# Patient Record
Sex: MALE | Race: WHITE
[De-identification: names, ages, dates, MRNs, and addresses within clinical notes are randomized per-mention and may not be internally consistent; named-entity substitution may affect disease eponyms.]

---

## 2023-04-30 ENCOUNTER — HOSPITAL ENCOUNTER (OUTPATIENT)
Dept: HOSPITAL 33 - ED | Age: 23
Setting detail: OBSERVATION
LOS: 2 days | Discharge: HOME | End: 2023-05-02
Attending: FAMILY MEDICINE | Admitting: FAMILY MEDICINE
Payer: COMMERCIAL

## 2023-04-30 DIAGNOSIS — I10: ICD-10-CM

## 2023-04-30 DIAGNOSIS — Z20.828: ICD-10-CM

## 2023-04-30 DIAGNOSIS — K81.0: Primary | ICD-10-CM

## 2023-04-30 DIAGNOSIS — K82.8: ICD-10-CM

## 2023-04-30 DIAGNOSIS — Z79.899: ICD-10-CM

## 2023-04-30 LAB
ALBUMIN SERPL-MCNC: 4.8 G/DL (ref 3.5–5)
ALP SERPL-CCNC: 103 U/L (ref 38–126)
ALT SERPL-CCNC: 102 U/L (ref 0–50)
ANION GAP SERPL CALC-SCNC: 15.8 MEQ/L (ref 5–15)
AST SERPL QL: 55 U/L (ref 17–59)
BACTERIA UR CULT: NO
BASOPHILS # BLD AUTO: 0.05 X10^3/UL (ref 0–0.4)
BASOPHILS NFR BLD AUTO: 0.4 % (ref 0–0.4)
BILIRUB BLD-MCNC: 1.4 MG/DL (ref 0.2–1.3)
BUN SERPL-MCNC: 12 MG/DL (ref 9–20)
CALCIUM SPEC-MCNC: 9.1 MG/DL (ref 8.4–10.2)
CHLORIDE SERPL-SCNC: 95 MMOL/L (ref 98–107)
CO2 SERPL-SCNC: 28 MMOL/L (ref 22–30)
CREAT SERPL-MCNC: 0.68 MG/DL (ref 0.66–1.25)
EOSINOPHIL # BLD AUTO: 0.05 X10^3/UL (ref 0–0.5)
FLUAV AG NPH QL IA: NEGATIVE
FLUBV AG NPH QL IA: NEGATIVE
GFR SERPLBLD BASED ON 1.73 SQ M-ARVRAT: > 60 ML/MIN
GLUCOSE SERPL-MCNC: 136 MG/DL (ref 74–106)
HCT VFR BLD AUTO: 49 % (ref 42–50)
HGB BLD-MCNC: 15.9 G/DL (ref 12.5–18)
IMM GRANULOCYTES # BLD: 0.12 X10^3U/L (ref 0–0.03)
IMM GRANULOCYTES NFR BLD: 1 % (ref 0–0.4)
LIPASE SERPL-CCNC: 37 U/L (ref 23–300)
LYMPHOCYTES # SPEC AUTO: 1.22 X10^3/UL (ref 1–4.6)
MCH RBC QN AUTO: 27.9 PG (ref 26–32)
MCHC RBC AUTO-ENTMCNC: 32.4 G/DL (ref 32–36)
MONOCYTES # BLD AUTO: 1.16 X10^3/UL (ref 0–1.3)
NRBC # BLD AUTO: 0 X10^3U/L (ref 0–0.01)
NRBC BLD AUTO-RTO: 0 % (ref 0–0.1)
PLATELET # BLD AUTO: 300 X10^3/UL (ref 150–450)
POTASSIUM SERPLBLD-SCNC: 3.9 MMOL/L (ref 3.5–5.1)
PROT SERPL-MCNC: 8.9 G/DL (ref 6.3–8.2)
RBC # BLD AUTO: 5.69 X10^6/UL (ref 4.1–5.6)
RBC # URNS HPF: (no result) /HPF (ref 0–5)
RSV AG SPEC QL IA: NEGATIVE
SARS-COV-2 AG RESP QL IA.RAPID: NEGATIVE
SODIUM SERPL-SCNC: 135 MMOL/L (ref 137–145)
WBC # BLD AUTO: 11.6 X10^3/UL (ref 4–10.5)
WBC URNS QL MICRO: (no result) /HPF (ref 0–5)

## 2023-04-30 PROCEDURE — 96374 THER/PROPH/DIAG INJ IV PUSH: CPT

## 2023-04-30 PROCEDURE — 81001 URINALYSIS AUTO W/SCOPE: CPT

## 2023-04-30 PROCEDURE — 96375 TX/PRO/DX INJ NEW DRUG ADDON: CPT

## 2023-04-30 PROCEDURE — 0241U: CPT

## 2023-04-30 PROCEDURE — 93005 ELECTROCARDIOGRAM TRACING: CPT

## 2023-04-30 PROCEDURE — 80053 COMPREHEN METABOLIC PANEL: CPT

## 2023-04-30 PROCEDURE — 74176 CT ABD & PELVIS W/O CONTRAST: CPT

## 2023-04-30 PROCEDURE — G0378 HOSPITAL OBSERVATION PER HR: HCPCS

## 2023-04-30 PROCEDURE — 85025 COMPLETE CBC W/AUTO DIFF WBC: CPT

## 2023-04-30 PROCEDURE — 96365 THER/PROPH/DIAG IV INF INIT: CPT

## 2023-04-30 PROCEDURE — 93041 RHYTHM ECG TRACING: CPT

## 2023-04-30 PROCEDURE — 83690 ASSAY OF LIPASE: CPT

## 2023-04-30 PROCEDURE — 76705 ECHO EXAM OF ABDOMEN: CPT

## 2023-04-30 PROCEDURE — 36415 COLL VENOUS BLD VENIPUNCTURE: CPT

## 2023-04-30 PROCEDURE — 99285 EMERGENCY DEPT VISIT HI MDM: CPT

## 2023-04-30 NOTE — XRAY
CLINICAL HISTORY:Abd pain;

COMPARISON:None;

TECHNIQUES:Contiguous, multislice, nonenhanced CT scan of the abdomen and

pelvis in the axial plane with multiplanar reconstructions;

FINDINGS:

Mild enlarged liver showing no obvious focal lesion within the limitations of

noncontrast study.

Gallbladder shows no definite calculi inside.

Pancreas and spleen appear unremarkable.

No adrenal or retroperitoneal masses.

Both kidneys appear normal in size, shows normal contour and attenuation.  No

calculus, mass or hydronephrosis in either kidneys.

No ascites.  No para-aortic lymphadenopathy.

Few noncomplicated colonic diverticulosis mainly involving sigmoid colon

without evidence of diverticulitis.  Appendix visualized and appear within

normal limits.

Partially distended urinary bladder, appear free from intraluminal stones,

mass or diverticular outpouching.

Normal-sized prostate.

No acute osseous abnormalities or suspicious bony lesions.

Visualized sections of lower chest shows no focal mass or consolidation.

IMPRESSION:

Mild hepatomegaly.

Noncomplicated colonic diverticulosis mainly involving sigmoid colon without

evidence of diverticulitis.

Rest of the visualized abdominopelvic structures appear unremarkable.



_________________________________________



Electronically Signed by: Donavan Carter MD. ( 04/30/2023 15:41:48 CST)

## 2023-04-30 NOTE — ERPHSYRPT
- History of Present Illness


Time Seen by Provider: 04/30/23 15:02


Historian: patient


Exam Limitations: no limitations


Patient Subjective Stated Complaint: co right upper abd pain since yesterday , 

fever of 101 yesterday. last bm yesterday normal for him


Triage Nursing Assessment: pt alert, resp easy, walked in, skin w/d/p. no edema 

noted


Physician History: 





RUQ abd pain for a day, some fever, no other sx, still has his gallbladder.


Timing/Duration: yesterday


Activities at Onset: none


Quality: dullness


Abdominal Pain Onset Location: RUQ


Pain Radiation: no radiation


Severity of Pain-Max: moderate


Severity of Pain-Current: moderate


Modifying Factors: Improves With: nothing


Associated Symptoms: fever/chills, loss of appetite


Previous symptoms: no prior history


Allergies/Adverse Reactions: 








amoxicillin Allergy (Verified 04/30/23 14:56)


   





Home Medications: 








Lisinopril 20 mg*** [Zestril 20 MG***] 20 mg PO DAILY 04/30/23 [History]





Hx Influenza Vaccination/Date Given: No


Hx Pneumococcal Vaccination/Date Given: No


Immunizations Up to Date: Yes





Travel Risk





- International Travel


Have you traveled outside of the country in past 3 weeks: No





- Coronavirus Screening


Are you exhibiting any of the following symptoms?: No


Close contact with a COVID-19 positive Pt in past 14-21 Days: No





- Vaccine Status


Have you recieved a Covid-19 vaccination: Yes


: Pfizer





- Vaccination Dates


Date of 2cond Vaccination (if applicable): 2021





- Review of Systems


Constitutional: Fever


Eyes: No Symptoms


Ears, Nose, & Throat: No Symptoms


Respiratory: No Symptoms


Cardiac: No Symptoms


Abdominal/Gastrointestinal: Abdominal Pain


Genitourinary Symptoms: No Symptoms


Musculoskeletal: No Symptoms


Skin: No Symptoms


Neurological: No Symptoms


Psychological: No Symptoms


Endocrine: No Symptoms


Hematologic/Lymphatic: No Symptoms


Immunological/Allergic: No Symptoms


All Other Systems: Reviewed and Negative





- Past Medical History


Pertinent Past Medical History: Yes


Cardiac History: Hypertension





- Past Surgical History


Past Surgical History: Yes





- Social History


Smoking Status: Never smoker


Exposure to second hand smoke: No


Drug Use: none


Patient Lives Alone: Yes





- Nursing Vital Signs


Nursing Vital Signs: 


                               Initial Vital Signs











Temperature  98.8 F   04/30/23 14:58


 


Pulse Rate  114 H  04/30/23 14:58


 


Respiratory Rate  18   04/30/23 14:58


 


Blood Pressure  151/105   04/30/23 14:58


 


O2 Sat by Pulse Oximetry  96   04/30/23 14:58








                                   Pain Scale











Pain Intensity                 8

















- Physical Exam


General Appearance: mild distress


Eye Exam: PERRL/EOMI


Ears, Nose, Throat Exam: normal ENT inspection


Neck Exam: normal inspection, non-tender


Respiratory Exam: normal breath sounds, lungs clear


Cardiovascular Exam: regular rate/rhythm, normal heart sounds


Gastrointestinal/Abdomen Exam: soft, normal bowel sounds, tenderness (RUQ)


Male Genitalia Exam: normal genitalia


Rectal Exam: deferred


Back Exam: normal inspection, normal range of motion


Extremity Exam: normal inspection, normal range of motion


Neurologic Exam: alert, oriented x 3, cooperative


Skin Exam: normal color, warm


**SpO2 Interpretation**: normal


SpO2: 96


O2 Delivery: Room Air





- Course


Nursing assessment & vital signs reviewed: Yes


EKG Interpreted by Me: RATE ([id number]), Sinus Rhythm, NORMAL AXIS, NORMAL INTERVALS, 

NORMAL QRS, NORMAL ST-T





- CT Exams


  ** Abdomen/Pelvis


CT Interpretation: Negative, Tele-radiologist Report


Ordered Tests: 


                               Active Orders 24 hr











 Category Date Time Status


 


 EKG-ER Only STAT Care  04/30/23 18:23 Active


 


 ABDOMEN AND PELVIS W/0 CONTRAS [CT] Stat Exams  04/30/23 15:11 Completed


 


 CBC W DIFF Stat Lab  04/30/23 15:10 Completed


 


 CMP Stat Lab  04/30/23 15:36 Completed


 


 LIPASE Stat Lab  04/30/23 15:36 Completed


 


 UA W/RFX UR CULTURE Stat Lab  04/30/23 15:52 Completed








Medication Summary











Generic Name Dose Route Start Last Admin





  Trade Name Freq  PRN Reason Stop Dose Admin


 


Acetaminophen  1,000 mg  04/30/23 18:36  04/30/23 18:37





  Acetaminophen 500 Mg Tablet  PO  05/30/23 18:35  1,000 mg





  Q4H PRN PRN   Administration





  HEADACHE  














Discontinued Medications














Generic Name Dose Route Start Last Admin





  Trade Name Freq  PRN Reason Stop Dose Admin


 


Hydromorphone HCl  1 mg  04/30/23 19:17  04/30/23 19:41





  Hydromorphone 1 Mg/1ml Inj***  IV  04/30/23 19:18  1 mg





  STAT ONE   Administration


 


Hydromorphone HCl  Confirm  04/30/23 19:39 





  Hydromorphone 1 Mg/1ml Inj***  Administered  04/30/23 19:40 





  Dose  





  1 mg  





  .ROUTE  





  .STK-MED ONE  


 


Ceftriaxone Sodium/Dextrose  1 g in 50 mls @ 100 mls/hr  04/30/23 17:45  

04/30/23 19:10





  Rocephin 1 Gm-D5w 50 Ml Bag**  IV  04/30/23 18:14  Infused





  STAT STA   Infusion


 


Ceftriaxone Sodium/Dextrose  Confirm  04/30/23 17:58 





  Rocephin 1 Gm-D5w 50 Ml Bag**  Administered  04/30/23 17:59 





  Dose  





  1 g in 50 mls @ ud  





  IV  





  .STK-MED ONE  


 


Ketorolac Tromethamine  30 mg  04/30/23 17:35  04/30/23 17:39





  Ketorolac Tromethamine 30 Mg/Ml Inj  IV  04/30/23 17:36  30 mg





  STAT ONE   Administration


 


Ketorolac Tromethamine  Confirm  04/30/23 17:37 





  Ketorolac Tromethamine 30 Mg/Ml Inj  Administered  04/30/23 17:38 





  Dose  





  30 mg  





  .ROUTE  





  .STK-MED ONE  


 


Metoprolol Tartrate  5 mg  04/30/23 18:10  04/30/23 18:19





  Metoprolol Tartrate 5 Mg/5 Ml Vial  IV  04/30/23 18:11  5 mg





  STAT ONE   Administration


 


Metoprolol Tartrate  Confirm  04/30/23 18:16 





  Metoprolol Tartrate 5 Mg/5 Ml Vial  Administered  04/30/23 18:17 





  Dose  





  5 mg  





  IV  





  .STK-MED ONE  


 


Ondansetron HCl  4 mg  04/30/23 19:17  04/30/23 19:40





  Ondansetron Hcl 4 Mg/2 Ml Vial  IV  04/30/23 19:18  4 mg





  STAT ONE   Administration


 


Ondansetron HCl  Confirm  04/30/23 19:38 





  Ondansetron Hcl 4 Mg/2 Ml Vial  Administered  04/30/23 19:39 





  Dose  





  4 mg  





  .ROUTE  





  .STK-MED ONE  











Lab/Rad Data: 


                           Laboratory Result Diagrams





                                 04/30/23 15:10 





                                 04/30/23 15:36 





                               Laboratory Results











  04/30/23 04/30/23 04/30/23 Range/Units





  15:52 15:36 15:10 


 


WBC    11.6 H  (4.0-10.5)  x10^3/uL


 


RBC    5.69 H  (4.1-5.6)  x10^6/uL


 


Hgb    15.9  (12.5-18.0)  g/dL


 


Hct    49.0  (42-50)  %


 


MCV    86.1  ()  fL


 


MCH    27.9  (26-32)  pg


 


MCHC    32.4  (32-36)  g/dL


 


RDW    12.3  (11.5-14.0)  %


 


Plt Count    300  (150-450)  x10^3/uL


 


MPV    9.7  (7.5-11.0)  fL


 


Gran %    77.7 H  (36.0-66.0)  %


 


Immature Gran % (Auto)    1.0 H  (0.00-0.4)  %


 


Nucleat RBC Rel Count    0.0  (0.00-0.1)  %


 


Eos # (Auto)    0.05  (0-0.5)  x10^3/uL


 


Immature Gran # (Auto)    0.12 H  (0.00-0.03)  x10^3u/L


 


Absolute Lymphs (auto)    1.22  (1.0-4.6)  x10^3/uL


 


Absolute Monos (auto)    1.16  (0.0-1.3)  x10^3/uL


 


Absolute Nucleated RBC    0.00  (0.00-0.01)  x10^3u/L


 


Lymphocytes %    10.5 L  (24.0-44.0)  %


 


Monocytes %    10.0  (0.0-12.0)  %


 


Eosinophils %    0.4  (0.00-5.0)  %


 


Basophils %    0.4  (0.0-0.4)  %


 


Absolute Granulocytes    9.02 H  (1.4-6.9)  x10^3/uL


 


Basophils #    0.05  (0-0.4)  x10^3/uL


 


Sodium   135 L   (137-145)  mmol/L


 


Potassium   3.9   (3.5-5.1)  mmol/L


 


Chloride   95 L   ()  mmol/L


 


Carbon Dioxide   28   (22-30)  mmol/L


 


Anion Gap   15.8 H   (5-15)  MEQ/L


 


BUN   12   (9-20)  mg/dL


 


Creatinine   0.68   (0.66-1.25)  mg/dL


 


Estimated GFR   > 60.0   ML/MIN


 


Glucose   136 H   ()  mg/dL


 


Calcium   9.1   (8.4-10.2)  mg/dL


 


Total Bilirubin   1.40 H   (0.2-1.3)  mg/dL


 


AST   55   (17-59)  U/L


 


ALT   102 H   (0-50)  U/L


 


Alkaline Phosphatase   103   ()  U/L


 


Serum Total Protein   8.9 H   (6.3-8.2)  g/dL


 


Albumin   4.8   (3.5-5.0)  g/dL


 


Lipase   37   ()  U/L


 


Urine Color  Yellow    (Yellow)  


 


Urine Appearance  Clear    (Clear)  


 


Urine pH  6.5    (4.6-8.0)  


 


Ur Specific Gravity  1.025    (1.005-1.030)  


 


Urine Protein  Negative    (Negative)  


 


Urine Glucose (UA)  Negative    (Negative)  mg/dL


 


Urine Ketones  80 A    (Negative)  


 


Urine Blood  Negative    (Negative)  


 


Urine Nitrite  Negative    (Negative)  


 


Urine Bilirubin  Negative    (Negative)  


 


Urine Urobilinogen  2.0 A    (0.2)  mg/dL


 


Ur Leukocyte Esterase  Negative    (Negative)  


 


U Hyaline Cast (Auto)  NONE SEEN    (0-2)  /LPF


 


Urine Microscopic RBC  0-2    (0-5)  /HPF


 


Urine Microscopic WBC  0-2    (0-5)  /HPF


 


Ur Epithelial Cells  None Seen    (None Seen)  /HPF


 


Urine Bacteria  None Seen    (None Seen)  /HPF


 


Urine Culture Reflexed  NO    (NO)  














- Progress


Progress: improved


Progress Note: 





04/30/23 20:23


Symptoms suggest gallbladder infection, but unremarkable CT and labs, needing 

narcotics for pain control, OK for obs per Dr. Todd.


Discussed with : Ortega


Will see patient in: hospital (observation)


Counseled pt/family regarding: lab results, diagnosis, need for follow-up, rad 

results





Medical Desision Making





- Independent Historian


Additional History obtained from: Family





- Discussion of managment


Care discussed with:: on-call "doc"


Reviewed:: Test results, Need for additional workup


Agreed on:: Treatment plan, place in obs


Will see patient: in hospital





- Diagnostic Testing


Diagnostic test were ordered, analyzed, and reviewed by me: Yes


Radiological Interpretation: Teleradiologist Report





- Risk of complications


Low Risk: Low risk of morbidity from additional dx testing or treatment





- Departure


Departure Disposition: Observation


Clinical Impression: 


 Cholecystitis, acute





Condition: Stable


Critical Care Time: No


Referrals: 


ALEISHA QIU NP [Primary Care Provider] - Follow up/PCP as directed

## 2023-05-01 LAB
ALBUMIN SERPL-MCNC: 3.9 G/DL (ref 3.5–5)
ALP SERPL-CCNC: 82 U/L (ref 38–126)
ALT SERPL-CCNC: 73 U/L (ref 0–50)
ANION GAP SERPL CALC-SCNC: 12.1 MEQ/L (ref 5–15)
AST SERPL QL: 37 U/L (ref 17–59)
BASOPHILS # BLD AUTO: 0.05 X10^3/UL (ref 0–0.4)
BASOPHILS NFR BLD AUTO: 0.5 % (ref 0–0.4)
BILIRUB BLD-MCNC: 0.9 MG/DL (ref 0.2–1.3)
BLD SMEAR INTERP: YES
BUN SERPL-MCNC: 16 MG/DL (ref 9–20)
CALCIUM SPEC-MCNC: 8.2 MG/DL (ref 8.4–10.2)
CHLORIDE SERPL-SCNC: 100 MMOL/L (ref 98–107)
CO2 SERPL-SCNC: 26 MMOL/L (ref 22–30)
CREAT SERPL-MCNC: 0.68 MG/DL (ref 0.66–1.25)
EOSINOPHIL # BLD AUTO: 0.08 X10^3/UL (ref 0–0.5)
GFR SERPLBLD BASED ON 1.73 SQ M-ARVRAT: > 60 ML/MIN
GLUCOSE SERPL-MCNC: 126 MG/DL (ref 74–106)
HCT VFR BLD AUTO: 44.9 % (ref 42–50)
HGB BLD-MCNC: 14.8 G/DL (ref 12.5–18)
IMM GRANULOCYTES # BLD: 0.13 X10^3U/L (ref 0–0.03)
IMM GRANULOCYTES NFR BLD: 1.2 % (ref 0–0.4)
LYMPHOCYTES # SPEC AUTO: 1.64 X10^3/UL (ref 1–4.6)
MCH RBC QN AUTO: 28 PG (ref 26–32)
MCHC RBC AUTO-ENTMCNC: 33 G/DL (ref 32–36)
MONOCYTES # BLD AUTO: 1.75 X10^3/UL (ref 0–1.3)
NRBC # BLD AUTO: 0 X10^3U/L (ref 0–0.01)
NRBC BLD AUTO-RTO: 0 % (ref 0–0.1)
PLATELET # BLD AUTO: 275 X10^3/UL (ref 150–450)
POTASSIUM SERPLBLD-SCNC: 3.8 MMOL/L (ref 3.5–5.1)
PROT SERPL-MCNC: 7.4 G/DL (ref 6.3–8.2)
RBC # BLD AUTO: 5.28 X10^6/UL (ref 4.1–5.6)
SODIUM SERPL-SCNC: 134 MMOL/L (ref 137–145)
WBC # BLD AUTO: 10.5 X10^3/UL (ref 4–10.5)

## 2023-05-01 RX ADMIN — HYDROMORPHONE HYDROCHLORIDE PRN MG: 1 INJECTION, SOLUTION INTRAMUSCULAR; INTRAVENOUS; SUBCUTANEOUS at 15:34

## 2023-05-01 RX ADMIN — METRONIDAZOLE SCH MLS/HR: 500 INJECTION, SOLUTION INTRAVENOUS at 05:31

## 2023-05-01 RX ADMIN — HYDROMORPHONE HYDROCHLORIDE PRN MG: 1 INJECTION, SOLUTION INTRAMUSCULAR; INTRAVENOUS; SUBCUTANEOUS at 21:54

## 2023-05-01 RX ADMIN — HYDROMORPHONE HYDROCHLORIDE PRN MG: 1 INJECTION, SOLUTION INTRAMUSCULAR; INTRAVENOUS; SUBCUTANEOUS at 05:47

## 2023-05-01 RX ADMIN — METRONIDAZOLE SCH MLS/HR: 500 INJECTION, SOLUTION INTRAVENOUS at 21:10

## 2023-05-01 RX ADMIN — METRONIDAZOLE SCH MLS/HR: 500 INJECTION, SOLUTION INTRAVENOUS at 14:27

## 2023-05-01 RX ADMIN — HYDROMORPHONE HYDROCHLORIDE PRN MG: 1 INJECTION, SOLUTION INTRAMUSCULAR; INTRAVENOUS; SUBCUTANEOUS at 09:45

## 2023-05-01 RX ADMIN — METRONIDAZOLE SCH MLS/HR: 500 INJECTION, SOLUTION INTRAVENOUS at 00:32

## 2023-05-01 NOTE — PCM.HP
History of Present Illness





- Chief Complaint


Chief Complaint: abdominal pain


Date: 05/01/23


History of Present Illness: 


 is a 23 year old male patient of VANI Muñiz   who presented to ER 

C/O RUQ  pain and fever 101 since yesterday last bm yesterday normal . PMHx 

includes HTN. Patient has not had previous abdominal surgery.








- Review of Systems


Constitutional: Fever


Eyes: No Symptoms


Ears, Nose, & Throat: No Symptoms


Respiratory: No Symptoms


Cardiac: No Symptoms


Abdominal/Gastrointestinal: Abdominal Pain, Nausea


Genitourinary Symptoms: No Symptoms


Musculoskeletal: No Symptoms


Skin: No Symptoms


Neurological: No Symptoms


Psychological: No Symptoms


Endocrine: No Symptoms


Hematologic/Lymphatic: No Symptoms





Medications & Allergies


Home Medications: 


                              Home Medication List





Lisinopril 20 mg*** [Zestril 20 MG***] 20 mg PO DAILY 04/30/23 [History Confi

rmed 05/01/23]








Allergies/Adverse Reactions: 


                                    Allergies











Allergy/AdvReac Type Severity Reaction Status Date / Time


 


amoxicillin Allergy   Verified 04/30/23 14:56














- Past Medical History


Past Medical History: Yes


Neurological History: No Pertinent History


ENT History: No Pertinent History


Cardiac History: Hypertension


Respiratory History: No Pertinent History


Endocrine Medical History: No Pertinent History


Musculoskelatal History: No Pertinent History


GI Medical History: No Pertinent History


 History: No Pertinent History


Pyscho-Social History: No Pertinent History


Male Reproductive Disorders: No Pertinent History





- Past Surgical History


Past Surgical History: Yes


Neuro Surgical History: No Pertinent History


Cardiac History: No Pertinent History


Respiratory Surgery: No Pertinent History


GI Surgical History: No Pertinent History


Genitourinary Surgical Hx: No Pertinent History


Musculskeletal Surgical Hx: No Pertinent History


Male Surgical History: No Pertinent History





- Social History


Smoking Status: Never smoker


Exposure to second hand smoke: No


Alcohol: Occasionally


Drug Use: none





- Physical Exam


Vital Signs: 


                               Vital Signs - 24 hr











  Temp Pulse Resp BP Pulse Ox


 


 05/01/23 08:00  97.7 F  90  18  111/57  93 L


 


 05/01/23 04:17  99.1 F  70  20  119/70  99


 


 05/01/23 02:45  99.1 F  70  20  119/70  99


 


 05/01/23 01:26  98.1 F  94 H  17  


 


 04/30/23 22:08  98.1 F  94 H  17 04/30/23 21:34  98.1 F  94 H  17  


 


 04/30/23 20:32      96


 


 04/30/23 20:00   101 H  22  122/78  95


 


 04/30/23 19:43   98 H  13  122/78  95


 


 04/30/23 18:34  101.6 F  105 H  20  118/81  97


 


 04/30/23 18:05   121 H  16  159/87  94 L


 


 04/30/23 17:00   110 H  18  165/92  96


 


 04/30/23 16:39   114 H  18  140/106  98


 


 04/30/23 14:58  98.8 F  114 H  18  151/105  96











General Appearance: no apparent distress


Neurologic Exam: alert, oriented x 3, cooperative, normal mood/affect


Eye Exam: eyes nml inspection


Ears, Nose, Throat Exam: normal ENT inspection


Neck Exam: normal inspection


Respiratory Exam: normal breath sounds


Cardiovascular Exam: regular rate/rhythm


Gastrointestinal/Abdomen Exam: soft, tenderness (RUQ), guarding


Rectal Exam: not done


Back Exam: normal inspection


Extremity Exam: normal inspection


Skin Exam: normal color, warm, dry





Results





- Labs


Lab/Micro Results: 


                            Lab Results-Last 24 Hours











  04/30/23 04/30/23 04/30/23 Range/Units





  15:10 15:36 15:52 


 


WBC  11.6 H    (4.0-10.5)  x10^3/uL


 


RBC  5.69 H    (4.1-5.6)  x10^6/uL


 


Hgb  15.9    (12.5-18.0)  g/dL


 


Hct  49.0    (42-50)  %


 


MCV  86.1    ()  fL


 


MCH  27.9    (26-32)  pg


 


MCHC  32.4    (32-36)  g/dL


 


RDW  12.3    (11.5-14.0)  %


 


Plt Count  300    (150-450)  x10^3/uL


 


MPV  9.7    (7.5-11.0)  fL


 


Gran %  77.7 H    (36.0-66.0)  %


 


Immature Gran % (Auto)  1.0 H    (0.00-0.4)  %


 


Nucleat RBC Rel Count  0.0    (0.00-0.1)  %


 


Eos # (Auto)  0.05    (0-0.5)  x10^3/uL


 


Immature Gran # (Auto)  0.12 H    (0.00-0.03)  x10^3u/L


 


Absolute Lymphs (auto)  1.22    (1.0-4.6)  x10^3/uL


 


Absolute Monos (auto)  1.16    (0.0-1.3)  x10^3/uL


 


Absolute Nucleated RBC  0.00    (0.00-0.01)  x10^3u/L


 


Lymphocytes %  10.5 L    (24.0-44.0)  %


 


Monocytes %  10.0    (0.0-12.0)  %


 


Eosinophils %  0.4    (0.00-5.0)  %


 


Basophils %  0.4    (0.0-0.4)  %


 


Absolute Granulocytes  9.02 H    (1.4-6.9)  x10^3/uL


 


Basophils #  0.05    (0-0.4)  x10^3/uL


 


Sodium   135 L   (137-145)  mmol/L


 


Potassium   3.9   (3.5-5.1)  mmol/L


 


Chloride   95 L   ()  mmol/L


 


Carbon Dioxide   28   (22-30)  mmol/L


 


Anion Gap   15.8 H   (5-15)  MEQ/L


 


BUN   12   (9-20)  mg/dL


 


Creatinine   0.68   (0.66-1.25)  mg/dL


 


Estimated GFR   > 60.0   ML/MIN


 


Glucose   136 H   ()  mg/dL


 


Calcium   9.1   (8.4-10.2)  mg/dL


 


Total Bilirubin   1.40 H   (0.2-1.3)  mg/dL


 


AST   55   (17-59)  U/L


 


ALT   102 H   (0-50)  U/L


 


Alkaline Phosphatase   103   ()  U/L


 


Serum Total Protein   8.9 H   (6.3-8.2)  g/dL


 


Albumin   4.8   (3.5-5.0)  g/dL


 


Lipase   37   ()  U/L


 


Urine Color    Yellow  (Yellow)  


 


Urine Appearance    Clear  (Clear)  


 


Urine pH    6.5  (4.6-8.0)  


 


Ur Specific Gravity    1.025  (1.005-1.030)  


 


Urine Protein    Negative  (Negative)  


 


Urine Glucose (UA)    Negative  (Negative)  mg/dL


 


Urine Ketones    80 A  (Negative)  


 


Urine Blood    Negative  (Negative)  


 


Urine Nitrite    Negative  (Negative)  


 


Urine Bilirubin    Negative  (Negative)  


 


Urine Urobilinogen    2.0 A  (0.2)  mg/dL


 


Ur Leukocyte Esterase    Negative  (Negative)  


 


U Hyaline Cast (Auto)    NONE SEEN  (0-2)  /LPF


 


Urine Microscopic RBC    0-2  (0-5)  /HPF


 


Urine Microscopic WBC    0-2  (0-5)  /HPF


 


Ur Epithelial Cells    None Seen  (None Seen)  /HPF


 


Urine Bacteria    None Seen  (None Seen)  /HPF


 


Urine Culture Reflexed    NO  (NO)  


 


Influenza Type A Ag     (NEGATIVE)  


 


Influenza Type B Ag     (NEGATIVE)  


 


RSV (PCR)     (NEGATIVE)  


 


SARS-CoV-2 (PCR)     (NEGATIVE)  


 


Slides for Path Review     














  04/30/23 05/01/23 05/01/23 Range/Units





  19:31 04:30 04:30 


 


WBC   10.5   (4.0-10.5)  x10^3/uL


 


RBC   5.28   (4.1-5.6)  x10^6/uL


 


Hgb   14.8   (12.5-18.0)  g/dL


 


Hct   44.9   (42-50)  %


 


MCV   85.0   ()  fL


 


MCH   28.0   (26-32)  pg


 


MCHC   33.0   (32-36)  g/dL


 


RDW   12.6   (11.5-14.0)  %


 


Plt Count   275   (150-450)  x10^3/uL


 


MPV   9.3   (7.5-11.0)  fL


 


Gran %   65.3   (36.0-66.0)  %


 


Immature Gran % (Auto)   1.2 H   (0.00-0.4)  %


 


Nucleat RBC Rel Count   0.0   (0.00-0.1)  %


 


Eos # (Auto)   0.08   (0-0.5)  x10^3/uL


 


Immature Gran # (Auto)   0.13 H   (0.00-0.03)  x10^3u/L


 


Absolute Lymphs (auto)   1.64   (1.0-4.6)  x10^3/uL


 


Absolute Monos (auto)   1.75 H   (0.0-1.3)  x10^3/uL


 


Absolute Nucleated RBC   0.00   (0.00-0.01)  x10^3u/L


 


Lymphocytes %   15.6 L   (24.0-44.0)  %


 


Monocytes %   16.6 H   (0.0-12.0)  %


 


Eosinophils %   0.8   (0.00-5.0)  %


 


Basophils %   0.5   (0.0-0.4)  %


 


Absolute Granulocytes   6.87   (1.4-6.9)  x10^3/uL


 


Basophils #   0.05   (0-0.4)  x10^3/uL


 


Sodium    134 L  (137-145)  mmol/L


 


Potassium    3.8  (3.5-5.1)  mmol/L


 


Chloride    100  ()  mmol/L


 


Carbon Dioxide    26  (22-30)  mmol/L


 


Anion Gap    12.1  (5-15)  MEQ/L


 


BUN    16  (9-20)  mg/dL


 


Creatinine    0.68  (0.66-1.25)  mg/dL


 


Estimated GFR    > 60.0  ML/MIN


 


Glucose    126 H  ()  mg/dL


 


Calcium    8.2 L  (8.4-10.2)  mg/dL


 


Total Bilirubin    0.90  (0.2-1.3)  mg/dL


 


AST    37  (17-59)  U/L


 


ALT    73 H  (0-50)  U/L


 


Alkaline Phosphatase    82  ()  U/L


 


Serum Total Protein    7.4  (6.3-8.2)  g/dL


 


Albumin    3.9  (3.5-5.0)  g/dL


 


Lipase     ()  U/L


 


Urine Color     (Yellow)  


 


Urine Appearance     (Clear)  


 


Urine pH     (4.6-8.0)  


 


Ur Specific Gravity     (1.005-1.030)  


 


Urine Protein     (Negative)  


 


Urine Glucose (UA)     (Negative)  mg/dL


 


Urine Ketones     (Negative)  


 


Urine Blood     (Negative)  


 


Urine Nitrite     (Negative)  


 


Urine Bilirubin     (Negative)  


 


Urine Urobilinogen     (0.2)  mg/dL


 


Ur Leukocyte Esterase     (Negative)  


 


U Hyaline Cast (Auto)     (0-2)  /LPF


 


Urine Microscopic RBC     (0-5)  /HPF


 


Urine Microscopic WBC     (0-5)  /HPF


 


Ur Epithelial Cells     (None Seen)  /HPF


 


Urine Bacteria     (None Seen)  /HPF


 


Urine Culture Reflexed     (NO)  


 


Influenza Type A Ag  NEGATIVE    (NEGATIVE)  


 


Influenza Type B Ag  NEGATIVE    (NEGATIVE)  


 


RSV (PCR)  NEGATIVE    (NEGATIVE)  


 


SARS-CoV-2 (PCR)  NEGATIVE    (NEGATIVE)  


 


Slides for Path Review   YES   














- Radiology Impressions


Radiology Exams & Impressions: 


                              Radiology Procedures











 Category Date Time Status


 


 ABDOMEN AND PELVIS W/0 CONTRAS [CT] Stat Exams  04/30/23 15:11 Completed


 


 GALLBLADDER [US] Urgent Exams  05/01/23 22:22 Completed














Assessment/Plan


(1) Cholecystitis, acute


Current Visit: Yes   Status: Acute   Code(s): K81.0 - ACUTE CHOLECYSTITIS   





(2) Sludge in gallbladder


Current Visit: Yes   Status: Acute   Code(s): K82.8 - OTHER SPECIFIED DISEASES 

OF GALLBLADDER   





(3) HTN (hypertension)


Current Visit: Yes   Status: Chronic   Code(s): I10 - ESSENTIAL (PRIMARY) 

HYPERTENSION

## 2023-05-01 NOTE — CONS
CONSULT DATE:  05/01/2023     



HISTORY:  The patient is a 23-year-old who had some right upper quadrant pain that had 
been going on for a day or two. He was a little bit improved. He apparently had a CT scan 
show some mild hepatomegaly, some diverticulosis without diverticulitis otherwise no acute 
process. Dr. Mcrae is on call for our group today. As I am doing some cases here he asked 
that I see the patient. We did get an ultrasound that had some minimal sludge. Right when 
I was notified of the consult I came down to the floor but the patient had already been 
given a diet tray. He was no longer NPO. His ultrasound showed no evidence of any wall 
thickening or gallstones, no pericholecystic fluid, no distention, just minimal 
gallbladder sludge. Surgery consult was called and the patient was given a tray at the 
same time so he is no longer NPO. His liver function tests were unremarkable. Total 
bilirubin 0.9, AST 37, ALT 73, alkaline phosphatase 82, creatinine 0.68. White count 10.5, 
hemoglobin 14.8, PLT count 275,000. He did have a temperature two days ago. No bloody 
stools or diarrhea. He had some right upper quadrant aches and pains. 



PAST MEDICAL HISTORY:  Hypertension.



PAST SURGICAL HISTORY:  Tonsillectomy. He denied any abdominal surgery before. 



HOME MEDICATIONS:  Lisinopril. 



ALLERGIES:  AMOXICILLIN.



FAMILY HISTORY:  Negative in regards to this problem.



SOCIAL HISTORY:  No smoking or alcohol abuse. 



REVIEW OF SYSTEMS:  Fourteen systems reviewed pertinent for as noted above. Recover of the 
white count of 10 here. No chest pain or palpitations. 



PHYSICAL EXAMINATION:  

GENERAL:  No acute distress.

HEENT:  Sclera nonicteric. EOMI. Oropharynx moist mucous membranes.

NECK:  No JVD.

CHEST:  Equal excursion, nonlabored breathing.

CVS:  Regular rate and rhythm.

ABDOMEN:  Soft.   

EXTREMITIES:  No significant edema.  

NEURO:  Alert, moving extremities grossly symmetrically. 

PSYCH: Appropriate mood and affect. 

SKIN:  Dry. 



IMPRESSION:  Some right upper quadrant pain unclear etiology. It could be gastritis, 
duodenitis, peptic ulcer disease or biliary colic. He does not have any stones on the 
ultrasound, no wall thickening or pericholecystic fluid. No gallbladder distention on the 
ultrasound. He did have minimal sludge. I would have considered an upper endoscopy however 
the patient is not NPO at this time. I suggest a ERIC bains for further evaluation if needed 
and an EGD and will await results. If he is doing better and release he could have this 
done as an outpatient, follow up in the office. Otherwise, continue his home medications 
for his hypertension.

## 2023-05-01 NOTE — XRAY
Indication: Pain.



Two-dimensional gallbladder sonogram performed.



Comparison: None



Pancreas obscured due to overlying bowel gas.  Gallbladder normally distended

with minimal sludge in the dependent portion.  No gallstones, abnormal wall

thickening, or pericholecystic fluid.  Common bile duct measures 4.1 mm.  No

intrahepatic biliary distention.  Visualized liver is fatty in echogenicity.

No focal solid/cystic hepatic mass.  Right kidney measures 15 cm in length and

sonographically unremarkable.



Impression: Nonvisualization pancreas.  Minimal gallbladder sludge.  Fatty

liver.

## 2023-05-02 VITALS — SYSTOLIC BLOOD PRESSURE: 118 MMHG | HEART RATE: 83 BPM | DIASTOLIC BLOOD PRESSURE: 63 MMHG | OXYGEN SATURATION: 95 %

## 2023-05-02 RX ADMIN — HYDROMORPHONE HYDROCHLORIDE PRN MG: 1 INJECTION, SOLUTION INTRAMUSCULAR; INTRAVENOUS; SUBCUTANEOUS at 02:54

## 2023-05-02 RX ADMIN — METRONIDAZOLE SCH MLS/HR: 500 INJECTION, SOLUTION INTRAVENOUS at 05:04

## 2023-05-02 NOTE — PCM.DCORD
- Discharge


Disposition: Home, Self-Care


Condition: Good


Prescriptions: 


New


   Levofloxacin*** [Levofloxacin 500 MG Tablet***] 500 mg PO DAILY #7 tablet


   Metronidazole 500 mg*** [Flagyl 500 MG***] 0 mg PO BID #14 tablet


   Ketorolac Trometh 10 mg Tab** [TORAdol 10 MG TABLET***] 10 mg PO TID PRN #12 

tablet


     PRN Reason: for pain





Continue


   Lisinopril 20 mg*** [Zestril 20 MG***] 20 mg PO DAILY


Instructions:  Low Cholesterol, Saturated Fat, and Trans Fat Diet , Gallstones 

(DC), Why Water Is Important to Health


Additional Instructions: 


A Hida Scan is scheduled at [facility] on 5/5/23@ 11:30 a.m. NPO after midnight the 

night before and no narcotics/pain medicine 12 hours before HIDA scan.


Follow up with: 


ALEISHA QIU NP [Primary Care Provider] - 05/08/23 10:15 am


Forms:  Work/School Release Form

## 2023-06-05 ENCOUNTER — HOSPITAL ENCOUNTER (OUTPATIENT)
Dept: HOSPITAL 33 - SDC | Age: 23
Discharge: HOME | End: 2023-06-05
Attending: SURGERY
Payer: COMMERCIAL

## 2023-06-05 VITALS — DIASTOLIC BLOOD PRESSURE: 97 MMHG | HEART RATE: 85 BPM | SYSTOLIC BLOOD PRESSURE: 134 MMHG

## 2023-06-05 VITALS — OXYGEN SATURATION: 99 %

## 2023-06-05 DIAGNOSIS — R10.11: ICD-10-CM

## 2023-06-05 DIAGNOSIS — K82.8: ICD-10-CM

## 2023-06-05 DIAGNOSIS — K29.70: Primary | ICD-10-CM

## 2023-06-05 DIAGNOSIS — K20.90: ICD-10-CM

## 2023-06-05 DIAGNOSIS — K25.9: ICD-10-CM

## 2023-06-05 NOTE — HP
AMENDED REPORT:



DATE OF SURGERY:  06/05/2023 



HISTORY OF PRESENT ILLNESS:  The patient is a 23-year-old had some upper abdominal pain 
unclear etiology. He was in the hospital for a few days. Ultrasound showed minimal sludge. 
HIDA 53%.  I recommend upper endoscopy for evaluation for gastritis, peptic ulcer disease 
or other etiology. 



PAST MEDICAL HISTORY:  Hypertension. Negative for digestive problems. 



PAST SURGICAL HISTORY:  T&A.  



MEDICATIONS:  Lisinopril. 



ALLERGIES:  AMOXICILLIN.

  

FAMILY HISTORY:  Negative in regards to this problem.  



SOCIAL HISTORY:  No smoking. Occasional alcohol use. 



REVIEW OF SYSTEMS:  Fourteen systems reviewed. No chest pain or palpitations. Other 
systems negative or noncontributory as above and per preadmission questionnaire. 



PHYSICAL EXAMINATION:  Height 6'.  BMI 32. 

GENERAL:  No acute distress.

HEENT: Sclerae nonicteric. EOMI. Oral mucous membranes moist. 

NECK:  No JVD.

CHEST: Equal excursion, nonlabored breathing. 

CVS:  Regular rate and rhythm. 

ABDOMEN:  Soft. No peritoneal signs.   

EXTREMITIES:  No significant edema.

NEURO:  Alert, oriented, moving extremities symmetrically.   

PSYCH: Appropriate mood and affect.  

SKIN:  Dry.



IMPRESSION:  Prior history of right upper quadrant pain. HIDA scan 53%. Ultrasound did not 
show large stones. I recommend EGD to look for gastritis, peptic ulcer disease or other 
etiology. The patient was shown the risk sheet, procedure explained in detail including 
but not limited to bleeding or infection, risk of bowel injury or perforation possibly 
requiring further procedure, risk of missed or nondiagnosis or incomplete exam, possibly 
requiring barium swallow, other studies or procedures. General risk of anesthesia or 
sedation but not limited to, consent obtained, will proceed with EGD possible biopsy as an 
outpatient. Otherwise, continue medication for his hypertension.

## 2023-06-05 NOTE — OP
SURGERY DATE/TIME:  06/05/2023  1207



PREOPERATIVE DIAGNOSES:

1) History of right upper quadrant pain. 

2) History of minimal gallbladder sludge and HIDA normal if I recall 53%. 

3) Need for upper endoscopy to evaluate for gastritis or peptic ulcer disease.   



POSTOPERATIVE DIAGNOSES: 

1) Healing prepyloric ulcer. 

2) Mild gastritis. 

3) Minimal grade A distal esophagitis.  



PROCEDURES:  

1) EGD with cold biopsy of small bowel. 

2) Cold biopsy of margin of prepyloric ulcer to evaluate for Helicobacter pylori. 

3) Cold biopsy distal esophagus to evaluate for short segment distal esophagitis. 



SURGEON:        Dr. Toño Grider.



ANESTHESIA:    MAC. 



ESTIMATED BLOOD LOSS:   Minimal.    



INDICATIONS:  As noted above. Risks and benefits explained in detail and not limited to 
and consent obtained.     



DESCRIPTION OF PROCEDURE AND FINDINGS:  The patient is taken to the operating room. MAC 
anesthesia introduced. After official time out and no disagreement with planned procedure, 
a bite block positioned. Video gastroscope easily passed down the esophagus. He coughed a 
little bit when we first started but scope was able to be passed down the esophagus s 
through the patent pylorus to the third and fourth portion of the duodenum. Duodenum was 
fairly unremarkable. Cold biopsy taken in the small bowel for path. Good hemostasis noted. 
The scope pulled back in the stomach. He did appear to have a little bit of a contracture, 
very superficial while working on healing ulcer in the prepyloric area. Cold biopsy taken 
of the margin of this for path. The scope was then retroflexed. There were no signs of any 
large hiatal hernia. Scope straightened. Gastroesophageal junction about 40 cm. He does 
have a little, 0.5 cm to less than 2 cm, a little small, little very narrow erosion distal 
esophagus consistent with some mild esophagitis. The scope was carefully withdrawn. No 
signs of any other mucosal lesions. Cold biopsy is taken in the distal esophagus. The 
patient tolerated the procedure well. Findings discussed with his family member out in the 
waiting area. I did write him for a prescription as he was not taking any H2 blocker or 
proton pump inhibitor, Protonix. I will see him back in the office in a week or two.